# Patient Record
Sex: FEMALE | Race: WHITE | HISPANIC OR LATINO | URBAN - METROPOLITAN AREA
[De-identification: names, ages, dates, MRNs, and addresses within clinical notes are randomized per-mention and may not be internally consistent; named-entity substitution may affect disease eponyms.]

---

## 2023-10-11 ENCOUNTER — OFFICE VISIT (OUTPATIENT)
Dept: URBAN - METROPOLITAN AREA CLINIC 74 | Facility: CLINIC | Age: 38
End: 2023-10-11

## 2023-10-11 NOTE — HPI-TODAY'S VISIT:
The patient is 38-year-old female with no significant past medical history is presenting to our clinic today with abdominal pain.

## 2023-11-06 ENCOUNTER — LAB OUTSIDE AN ENCOUNTER (OUTPATIENT)
Dept: URBAN - METROPOLITAN AREA CLINIC 74 | Facility: CLINIC | Age: 38
End: 2023-11-06

## 2023-11-06 ENCOUNTER — TELEPHONE ENCOUNTER (OUTPATIENT)
Dept: URBAN - METROPOLITAN AREA CLINIC 74 | Facility: CLINIC | Age: 38
End: 2023-11-06

## 2023-11-06 ENCOUNTER — OFFICE VISIT (OUTPATIENT)
Dept: URBAN - METROPOLITAN AREA CLINIC 74 | Facility: CLINIC | Age: 38
End: 2023-11-06
Payer: COMMERCIAL

## 2023-11-06 VITALS
HEART RATE: 83 BPM | SYSTOLIC BLOOD PRESSURE: 128 MMHG | DIASTOLIC BLOOD PRESSURE: 98 MMHG | HEIGHT: 63 IN | WEIGHT: 170.2 LBS | BODY MASS INDEX: 30.16 KG/M2 | TEMPERATURE: 97.3 F

## 2023-11-06 DIAGNOSIS — R10.13 EPIGASTRIC ABDOMINAL PAIN: ICD-10-CM

## 2023-11-06 DIAGNOSIS — Z87.11 HISTORY OF GASTRIC ULCER: ICD-10-CM

## 2023-11-06 DIAGNOSIS — D50.0 IRON DEFICIENCY ANEMIA DUE TO CHRONIC BLOOD LOSS: ICD-10-CM

## 2023-11-06 DIAGNOSIS — R19.4 CHANGE IN BOWEL HABITS: ICD-10-CM

## 2023-11-06 DIAGNOSIS — R68.89 SIGNS AND SYMPTOMS OF ANEMIA: ICD-10-CM

## 2023-11-06 DIAGNOSIS — R11.2 INTRACTABLE NAUSEA AND VOMITING: ICD-10-CM

## 2023-11-06 DIAGNOSIS — Z98.84 HISTORY OF GASTRIC BYPASS: ICD-10-CM

## 2023-11-06 PROBLEM — 724556004: Status: ACTIVE | Noted: 2023-11-06

## 2023-11-06 PROCEDURE — 99204 OFFICE O/P NEW MOD 45 MIN: CPT | Performed by: PHYSICIAN ASSISTANT

## 2023-11-06 RX ORDER — HYOSCYAMINE SULFATE 0.12 MG/1
1 TABLET UNDER THE TONGUE AND ALLOW TO DISSOLVE AS NEEDED TABLET SUBLINGUAL THREE TIMES A DAY
Qty: 90 | Refills: 3 | OUTPATIENT
Start: 2023-11-06 | End: 2024-03-05

## 2023-11-06 RX ORDER — FERROUS SULFATE 325(65) MG
TAKE 1 TABLET BY MOUTH EVERY DAY FOR 90 DAYS TABLET ORAL
Qty: 90 EACH | Refills: 0 | Status: ACTIVE | COMMUNITY

## 2023-11-06 RX ORDER — LISDEXAMFETAMINE DIMESYLATE 70 MG/1
CAPSULE ORAL
Qty: 30 CAPSULE | Status: ACTIVE | COMMUNITY

## 2023-11-06 RX ORDER — LISDEXAMFETAMINE DIMESYLATE CAPSULES 70 MG/1
TAKE 1 CAPSULE BY MOUTH EVERY DAY CAPSULE ORAL
Qty: 10 EACH | Refills: 0 | Status: ON HOLD | COMMUNITY

## 2023-11-06 RX ORDER — PANTOPRAZOLE SODIUM 40 MG/1
1 TABLET TABLET, DELAYED RELEASE ORAL ONCE A DAY
Qty: 30 | Refills: 3 | OUTPATIENT
Start: 2023-11-06

## 2023-11-06 RX ORDER — LEVOTHYROXINE SODIUM 150 UG/1
TABLET ORAL
Qty: 90 TABLET | Status: ACTIVE | COMMUNITY

## 2023-11-06 NOTE — HPI-TODAY'S VISIT:
The patient is 38-year-old female with no significant past medical history is presenting to our clinic today with abdominal pain. She had Gastric bypas in 2015. She had EGD/EUS in 11/2020 in [place] with gastric ulcer as noted below. EUS was done due to abnormal CT imaging. EGD was done due to the patient symptoms. She use to take Prilosec in the past. She states that her symptoms started about 6 months ago. She ha spersistent nausea and vomiting after meals. She states that she moves her bowels every 4-5 days. She takes Fe supplement daily for Fe deficiency, which contributes to her constipation. No previous Colonoscopy.    -- The patient denies dyspepsia, dysphagia, odynophagia, hemoptysis, hematemesis, nausea, vomiting, regurgitation, melena, diarrhea, abdominal pain, hematochezia, fever, chills, chest pain, SOB, or any other GI complaints today.  -- The patient denies ETOH, Tobacco, and Illicit drug use.  -- The patient is up to date with Flu and COVID vaccine.  -- Denies NSAID's.  Diagnostic studies: -- CT scan of abdomen and pelvis on 10/10/2019 the abdomen, liver, spleen, pancreas, bilateral adrenal glands, are stable in appearance.  Gallbladder is mildly distended.  No pericholecystic or overt inflammatory process.  No intrahepatic biliary dilation.  Main portal vein is patent.  No acute abnormality noted.  Procedure: -- EUS on 11/18/2020 by Dr. Amezquita at [facility] noted no sign of significant pathology in the pancreas, pancreatic body, and pancreatic tail.  There was no evidence of significant pathology in the left lobe of the liver.  No specimen collected.  -- EGD on 11/18/2020 by Dr. Amezquita at [facility] noted normal esophagus.  Gastric bypass with normal-sized pouch and intact staple line.  Gastrojejunal anastomosis characterized by ulceration.  Normal examined jejunum.  Biopsy with chronic gastritis.  No H. pylori organisms noted.

## 2023-11-07 LAB
% SATURATION: 8
A/G RATIO: 1.5
ABSOLUTE BASOPHILS: 38
ABSOLUTE EOSINOPHILS: 82
ABSOLUTE LYMPHOCYTES: 1487
ABSOLUTE MONOCYTES: 479
ABSOLUTE NEUTROPHILS: 4215
ALBUMIN: 3.8
ALKALINE PHOSPHATASE: 79
ALT (SGPT): 10
AMYLASE: 25
AST (SGOT): 16
BASOPHILS: 0.6
BILIRUBIN, TOTAL: 0.3
BUN/CREATININE RATIO: 26
BUN: 11
CALCIUM: 8.8
CARBON DIOXIDE, TOTAL: 26
CHLORIDE: 106
CREATININE: 0.43
EGFR: 127
EOSINOPHILS: 1.3
FERRITIN: 28
GLOBULIN, TOTAL: 2.6
GLUCOSE: 88
HEMATOCRIT: 33.5
HEMOGLOBIN: 11
IRON BINDING CAPACITY: 318
IRON, TOTAL: 27
LIPASE: 20
LYMPHOCYTES: 23.6
MCH: 26.3
MCHC: 32.8
MCV: 80
MONOCYTES: 7.6
MPV: 10.5
NEUTROPHILS: 66.9
PLATELET COUNT: 352
POTASSIUM: 3.9
PROTEIN, TOTAL: 6.4
RDW: 14
RED BLOOD CELL COUNT: 4.19
SODIUM: 142
WHITE BLOOD CELL COUNT: 6.3

## 2023-11-10 ENCOUNTER — OFFICE VISIT (OUTPATIENT)
Dept: URBAN - METROPOLITAN AREA CLINIC 39 | Facility: CLINIC | Age: 38
End: 2023-11-10

## 2023-11-22 ENCOUNTER — CLAIMS CREATED FROM THE CLAIM WINDOW (OUTPATIENT)
Dept: URBAN - METROPOLITAN AREA SURGERY CENTER 30 | Facility: SURGERY CENTER | Age: 38
End: 2023-11-22
Payer: COMMERCIAL

## 2023-11-22 ENCOUNTER — CLAIMS CREATED FROM THE CLAIM WINDOW (OUTPATIENT)
Dept: URBAN - METROPOLITAN AREA CLINIC 4 | Facility: CLINIC | Age: 38
End: 2023-11-22
Payer: COMMERCIAL

## 2023-11-22 ENCOUNTER — TELEPHONE ENCOUNTER (OUTPATIENT)
Dept: URBAN - METROPOLITAN AREA CLINIC 40 | Facility: CLINIC | Age: 38
End: 2023-11-22

## 2023-11-22 DIAGNOSIS — K31.A15 INTESTINAL METAPLASIA OF STOMACH INVOLVING MULTIPLE SITES WITHOUT DYSPLASIA: ICD-10-CM

## 2023-11-22 DIAGNOSIS — K29.60 ADENOPAPILLOMATOSIS GASTRICA: ICD-10-CM

## 2023-11-22 DIAGNOSIS — K28.9 ANASTOMOTIC ULCER: ICD-10-CM

## 2023-11-22 DIAGNOSIS — K25.9 GASTRIC ULCER: ICD-10-CM

## 2023-11-22 DIAGNOSIS — R10.13 EPIGASTRIC ABDOMINAL PAIN: ICD-10-CM

## 2023-11-22 DIAGNOSIS — K29.40 CHRONIC ATROPHIC GASTRITIS WITHOUT BLEEDING: ICD-10-CM

## 2023-11-22 DIAGNOSIS — K29.50 CHRONIC GASTRITIS: ICD-10-CM

## 2023-11-22 PROCEDURE — 43239 EGD BIOPSY SINGLE/MULTIPLE: CPT | Performed by: INTERNAL MEDICINE

## 2023-11-22 PROCEDURE — G8907 PT DOC NO EVENTS ON DISCHARG: HCPCS | Performed by: INTERNAL MEDICINE

## 2023-11-22 PROCEDURE — 00731 ANES UPR GI NDSC PX NOS: CPT | Performed by: NURSE ANESTHETIST, CERTIFIED REGISTERED

## 2023-11-22 PROCEDURE — 88342 IMHCHEM/IMCYTCHM 1ST ANTB: CPT | Performed by: PATHOLOGY

## 2023-11-22 PROCEDURE — 88305 TISSUE EXAM BY PATHOLOGIST: CPT | Performed by: PATHOLOGY

## 2023-11-22 PROCEDURE — 88341 IMHCHEM/IMCYTCHM EA ADD ANTB: CPT | Performed by: PATHOLOGY

## 2023-11-22 RX ORDER — LEVOTHYROXINE SODIUM 150 UG/1
TABLET ORAL
Qty: 90 TABLET | Status: ACTIVE | COMMUNITY

## 2023-11-22 RX ORDER — FERROUS SULFATE 325(65) MG
TAKE 1 TABLET BY MOUTH EVERY DAY FOR 90 DAYS TABLET ORAL
Qty: 90 EACH | Refills: 0 | Status: ACTIVE | COMMUNITY

## 2023-11-22 RX ORDER — LISDEXAMFETAMINE DIMESYLATE 70 MG/1
CAPSULE ORAL
Qty: 30 CAPSULE | Status: ACTIVE | COMMUNITY

## 2023-11-22 RX ORDER — HYOSCYAMINE SULFATE 0.12 MG/1
1 TABLET UNDER THE TONGUE AND ALLOW TO DISSOLVE AS NEEDED TABLET SUBLINGUAL THREE TIMES A DAY
Qty: 90 | Refills: 3 | Status: ACTIVE | COMMUNITY
Start: 2023-11-06 | End: 2024-03-05

## 2023-11-22 RX ORDER — LISDEXAMFETAMINE DIMESYLATE CAPSULES 70 MG/1
TAKE 1 CAPSULE BY MOUTH EVERY DAY CAPSULE ORAL
Qty: 10 EACH | Refills: 0 | Status: ON HOLD | COMMUNITY

## 2023-11-22 RX ORDER — PANTOPRAZOLE SODIUM 40 MG/1
1 TABLET TABLET, DELAYED RELEASE ORAL ONCE A DAY
Qty: 30 | Refills: 3 | Status: ACTIVE | COMMUNITY
Start: 2023-11-06

## 2023-11-22 RX ORDER — SUCRALFATE 1 G/1
1 TABLET ON AN EMPTY STOMACH TABLET ORAL TWICE A DAY
Qty: 60 | Refills: 3 | OUTPATIENT
Start: 2023-11-22 | End: 2024-03-21

## 2023-12-13 ENCOUNTER — DASHBOARD ENCOUNTERS (OUTPATIENT)
Age: 38
End: 2023-12-13

## 2023-12-13 PROBLEM — 57246001: Status: ACTIVE | Noted: 2023-12-13

## 2023-12-13 PROBLEM — 196735001: Status: ACTIVE | Noted: 2023-12-13

## 2023-12-21 ENCOUNTER — OFFICE VISIT (OUTPATIENT)
Dept: URBAN - METROPOLITAN AREA CLINIC 74 | Facility: CLINIC | Age: 38
End: 2023-12-21

## 2023-12-21 RX ORDER — LISDEXAMFETAMINE DIMESYLATE 70 MG/1
CAPSULE ORAL
Qty: 30 CAPSULE | Status: ACTIVE | COMMUNITY

## 2023-12-21 RX ORDER — HYOSCYAMINE SULFATE 0.12 MG/1
1 TABLET UNDER THE TONGUE AND ALLOW TO DISSOLVE AS NEEDED TABLET SUBLINGUAL THREE TIMES A DAY
Qty: 90 | Refills: 3 | Status: ACTIVE | COMMUNITY
Start: 2023-11-06 | End: 2024-03-05

## 2023-12-21 RX ORDER — PANTOPRAZOLE SODIUM 40 MG/1
1 TABLET TABLET, DELAYED RELEASE ORAL ONCE A DAY
Qty: 30 | Refills: 3 | Status: ACTIVE | COMMUNITY
Start: 2023-11-06

## 2023-12-21 RX ORDER — LEVOTHYROXINE SODIUM 150 UG/1
TABLET ORAL
Qty: 90 TABLET | Status: ACTIVE | COMMUNITY

## 2023-12-21 RX ORDER — FERROUS SULFATE 325(65) MG
TAKE 1 TABLET BY MOUTH EVERY DAY FOR 90 DAYS TABLET ORAL
Qty: 90 EACH | Refills: 0 | Status: ACTIVE | COMMUNITY

## 2023-12-21 RX ORDER — LISDEXAMFETAMINE DIMESYLATE CAPSULES 70 MG/1
TAKE 1 CAPSULE BY MOUTH EVERY DAY CAPSULE ORAL
Qty: 10 EACH | Refills: 0 | Status: ON HOLD | COMMUNITY

## 2024-01-09 NOTE — HPI-TODAY'S VISIT:
The patient is 38-year-old female with no significant past medical history as noted below known to Dr. Duvall is presenting to our clinic today to discuss her recent EGD and test results. She continues with Pantoprazole and Hyoscyamine daily.    -- The patient denies dyspepsia, dysphagia, odynophagia, hemoptysis, hematemesis, nausea, vomiting, regurgitation, melena, diarrhea, abdominal pain, hematochezia, fever, chills, chest pain, SOB, or any other GI complaints today.  -- The patient denies ETOH, Tobacco, and Illicit drug use.  -- The patient is up to date with Flu and COVID vaccine.  -- Denies NSAID's.  Diagnostic studies: -- Labs on Amylase, Lipase, and CMP are normal. CBC with Hgb 11.0, Hct 35.5, and . Fe 27, TIBC 318, %Sat. 8, and Ferritin 28.   -- CT scan of abdomen and pelvis on 10/10/2019 the abdomen, liver, spleen, pancreas, bilateral adrenal glands, are stable in appearance.  Gallbladder is mildly distended.  No pericholecystic or overt inflammatory process.  No intrahepatic biliary dilation.  Main portal vein is patent.  No acute abnormality noted.  Procedure: -- EGD with biopsy on 11/22/2023 by Dr. Duvall noted normal esophagus.  Chronic gastritis.  Non-bleeding gastric ulcers with no stigmata of bleeding.  Normal examined jejunum.  Biopsy of stomach with chronic inactive gastritis with focal intestinal metaplasia and histological changes suggestive of treated H pylori gastritis.  Immunohistochemical stain negative for H. pylori.  However urea breath test or fecal antigen detection of H. pylori is recommended.  -- EUS on 11/18/2020 by Dr. Amezquita at [facility] noted no sign of significant pathology in the pancreas, pancreatic body, and pancreatic tail.  There was no evidence of significant pathology in the left lobe of the liver.  No specimen collected.  -- EGD on 11/18/2020 by Dr. Amezquita at [facility] noted normal esophagus.  Gastric bypass with normal-sized pouch and intact staple line.  Gastrojejunal anastomosis characterized by ulceration.  Normal examined jejunum.  Biopsy with chronic gastritis.  No H. pylori organisms noted. [FreeTextEntry1] : labs 1/5/24 and 1/4/24   CBC normal WBC 4.8  normal difff Hgb 13.9  plts 327  CMP normal  PT 10.9

## 2024-01-17 ENCOUNTER — ERX REFILL RESPONSE (OUTPATIENT)
Dept: URBAN - METROPOLITAN AREA CLINIC 40 | Facility: CLINIC | Age: 39
End: 2024-01-17

## 2024-01-17 RX ORDER — SUCRALFATE 1 G/1
1 TABLET ON AN EMPTY STOMACH TABLET ORAL TWICE A DAY
Qty: 60 | Refills: 3 | OUTPATIENT

## 2024-01-17 RX ORDER — SUCRALFATE 1 G/1
TAKE 1 TABLET BY MOUTH TWICE A DAY ON EMPTY STOMACH FOR 30 DAYS TABLET ORAL
Qty: 180 TABLET | Refills: 2 | OUTPATIENT